# Patient Record
Sex: FEMALE | URBAN - METROPOLITAN AREA
[De-identification: names, ages, dates, MRNs, and addresses within clinical notes are randomized per-mention and may not be internally consistent; named-entity substitution may affect disease eponyms.]

---

## 2022-06-17 ENCOUNTER — OFFICE VISIT (OUTPATIENT)
Dept: URBAN - METROPOLITAN AREA TELEHEALTH 2 | Facility: TELEHEALTH | Age: 37
End: 2022-06-17
Payer: COMMERCIAL

## 2022-06-17 VITALS — WEIGHT: 225 LBS | BODY MASS INDEX: 35.31 KG/M2 | HEIGHT: 67 IN

## 2022-06-17 DIAGNOSIS — E06.3 HASHIMOTO'S DISEASE: ICD-10-CM

## 2022-06-17 PROCEDURE — 97802 MEDICAL NUTRITION INDIV IN: CPT | Performed by: DIETITIAN, REGISTERED

## 2022-06-17 NOTE — HPI-TODAY'S VISIT:
Nutrition iitial visit.  Patient found me through insurance.  She was hashimotos 1 year ago.  Endocrinologist recommended that she work with RD.  Typical diet:  Patient does not eat breakfast.  She admits to eating a lot of carbohydrate.  [employer] caramel macchiato. venti.  10am premeir protein shake.  Ihop bowl of grits. Water.  4pm 1 cup green beans and 1.5 cups mac and cheese. 12 ounces  Caden peach reji green tea.  8pm:  [employer] cheese burger and med fries #1.  Water.

## 2022-06-21 PROBLEM — 21983002 HASHIMOTO'S DISEASE: Status: ACTIVE | Noted: 2022-06-21
